# Patient Record
Sex: MALE | Race: WHITE | URBAN - METROPOLITAN AREA
[De-identification: names, ages, dates, MRNs, and addresses within clinical notes are randomized per-mention and may not be internally consistent; named-entity substitution may affect disease eponyms.]

---

## 2018-12-07 PROBLEM — M54.12 CERVICAL RADICULITIS: Status: ACTIVE | Noted: 2018-12-07

## 2020-02-03 NOTE — PROGRESS NOTES
.  This was a second opinion and I spent 45 minutes with this patient greater than 50% of the time face-to-face discussing treatment optionsChief Complaint    No chief complaint on file. History of Present Illness:  Gabbie Hirsch is a 79 y.o. malehere for evaluation chief complaint of bilateral foot and ankle pain right much greater than left. He states he is diabetic and has been for 20 years. His hemoglobin A1c is in the low sixes and has been for long time. He is neuropathic and takes gabapentin. He has a history of lung cancer in 2007 and sees a rheumatologist but he is not sure if he has rheumatoid arthritis. Currently complains of 8 out of 10 pain. It is getting worse with time. He has multiple joints that bother him and any activity increases his pain. Elevation his heat and immobility make it better. He is a retired trooper. Medical History:  Patient's medications, allergies, past medical, surgical, social and family histories were reviewed and updated as appropriate. Review of Systems:  Pertinent items are noted in HPI  Review of systems reviewed from Patient History Form dated on 2/4/2020 and available in the patient's chart under the Media tab. Vital Signs: There were no vitals taken for this visit. General Exam:   Constitutional: Patient is adequately groomed with no evidence of malnutrition  DTRs: Deep tendon reflexes are intact  Mental Status: The patient is oriented to time, place and person. The patient's mood and affect are appropriate. Lymphatic: The lymphatic examination bilaterally reveals all areas to be without enlargement or induration. Foot Examination:    Inspection: Planovalgus right foot with chronic venous changes on the right side with what looks like chronic shin cellulitis.   Left side foot is at least underneath him BMI is nearly 51    Palpation: Tenderness throughout the right foot and ankle with any motion    Range of Motion: 20 degrees of right ankle dorsiflexion 10 degrees of plantarflexion left side shows 20 degrees of dorsiflexion 40 degrees of plantarflexion    Strength: Generally 4-/5 throughout with no focal weakness    Special Tests: Decreased sensation to [employer] 5.0 7 monofilament    Skin: There are no rashes, ulcerations or lesions. Gait: Antalgic    Reflex 2+ and symmetric    Additional Comments:       Additional Examinations:         Contralateral Exam: Both feet are involved right greater than left    Radiology:     X-rays obtained and reviewed in office:  Views 3  Location right ankle  Impression obtained previously along with CT scan shows that he has midfoot and hindfoot collapse on the right side he has subtalar and ankle degenerative changes on the left side but he still somewhat well aligned          Assessment : This patient is diabetic neuropathic with apparently a systemic arthropathy for which she sees a rheumatologist.  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 Office Procedures:  No orders of the defined types were placed in this encounter. Treatment Plan:  The etiology of acquired planovalgus foot diabetes and systemic arthritis and it's appropriate treatment were discussed in great detail. I wrote out his plan of care and copied it to the media section of his chart. His operative option on the right side would be a triple arthrodesis or possible [employer] arthrodesis. His risk of surgery would be incredibly high due to his comorbidities BMI poor skin condition.   I would recommend he get an appropriate diabetic hightop shoe and insert for the left side and either a Crow or an AFO with custom shoe on the right side he agrees with this plan follow-up with me as needed

## 2020-02-04 ENCOUNTER — OFFICE VISIT (OUTPATIENT)
Dept: ORTHOPEDIC SURGERY | Age: 70
End: 2020-02-04
Payer: MEDICARE

## 2020-02-04 VITALS — HEIGHT: 72 IN | WEIGHT: 315 LBS | BODY MASS INDEX: 42.66 KG/M2

## 2020-02-04 PROBLEM — E11.610 DIABETIC NEUROPATHIC ARTHRITIS (HCC): Status: ACTIVE | Noted: 2020-02-04

## 2020-02-04 PROCEDURE — 99204 OFFICE O/P NEW MOD 45 MIN: CPT | Performed by: ORTHOPAEDIC SURGERY

## 2020-02-04 RX ORDER — OXYCODONE AND ACETAMINOPHEN 10; 325 MG/1; MG/1
1 TABLET ORAL EVERY 6 HOURS PRN
COMMUNITY

## 2020-02-04 RX ORDER — AMLODIPINE BESYLATE 10 MG/1
10 TABLET ORAL DAILY
COMMUNITY

## 2020-02-04 RX ORDER — HYDRALAZINE HYDROCHLORIDE 50 MG/1
50 TABLET, FILM COATED ORAL 3 TIMES DAILY
COMMUNITY
End: 2020-08-24

## 2020-02-04 RX ORDER — OMEPRAZOLE AND SODIUM BICARBONATE 40; 1100 MG/1; MG/1
1 CAPSULE ORAL
COMMUNITY

## 2020-02-04 RX ORDER — FLUOXETINE 10 MG/1
10 CAPSULE ORAL DAILY
COMMUNITY

## 2020-02-04 RX ORDER — FUROSEMIDE 40 MG/1
40 TABLET ORAL 2 TIMES DAILY
COMMUNITY
End: 2020-08-24

## 2020-02-04 RX ORDER — SPIRONOLACTONE 25 MG/1
25 TABLET ORAL DAILY
COMMUNITY

## 2020-02-04 RX ORDER — FOLIC ACID 1 MG/1
1 TABLET ORAL DAILY
COMMUNITY

## 2020-02-04 RX ORDER — BACLOFEN 10 MG/1
10 TABLET ORAL 3 TIMES DAILY
COMMUNITY

## 2020-02-04 RX ORDER — ALLOPURINOL 300 MG/1
300 TABLET ORAL DAILY
COMMUNITY

## 2020-02-04 RX ORDER — CELECOXIB 200 MG/1
200 CAPSULE ORAL 2 TIMES DAILY
COMMUNITY
End: 2020-08-24

## 2020-02-04 RX ORDER — LISINOPRIL 40 MG/1
40 TABLET ORAL DAILY
COMMUNITY
End: 2020-08-24

## 2020-02-04 RX ORDER — LEFLUNOMIDE 20 MG/1
20 TABLET ORAL DAILY
COMMUNITY

## 2020-02-04 RX ORDER — GABAPENTIN 600 MG/1
600 TABLET ORAL 2 TIMES DAILY
COMMUNITY

## 2020-02-04 RX ORDER — PREDNISONE 1 MG/1
5 TABLET ORAL DAILY
COMMUNITY
End: 2020-08-24

## 2020-02-04 RX ORDER — TRAZODONE HYDROCHLORIDE 100 MG/1
100 TABLET ORAL NIGHTLY
COMMUNITY

## 2020-02-04 RX ORDER — DOCUSATE SODIUM 100 MG/1
100 CAPSULE, LIQUID FILLED ORAL 2 TIMES DAILY
COMMUNITY

## 2020-02-04 RX ORDER — ATORVASTATIN CALCIUM 10 MG/1
10 TABLET, FILM COATED ORAL DAILY
COMMUNITY

## 2020-02-04 RX ORDER — FINASTERIDE 5 MG/1
5 TABLET, FILM COATED ORAL DAILY
COMMUNITY

## 2020-02-04 RX ORDER — CLONIDINE HYDROCHLORIDE 0.2 MG/1
0.2 TABLET ORAL 2 TIMES DAILY
COMMUNITY

## 2020-08-24 PROBLEM — R60.0 LOCALIZED EDEMA: Status: ACTIVE | Noted: 2020-08-24

## 2020-08-24 PROBLEM — N17.9 AKI (ACUTE KIDNEY INJURY) (HCC): Status: ACTIVE | Noted: 2020-08-24

## 2020-09-10 ENCOUNTER — OFFICE VISIT (OUTPATIENT)
Dept: RHEUMATOLOGY | Age: 70
End: 2020-09-10
Payer: MEDICARE

## 2020-09-10 VITALS — BODY MASS INDEX: 42.66 KG/M2 | HEIGHT: 72 IN | WEIGHT: 315 LBS | TEMPERATURE: 97.2 F

## 2020-09-10 PROCEDURE — 99205 OFFICE O/P NEW HI 60 MIN: CPT | Performed by: INTERNAL MEDICINE

## 2020-09-10 NOTE — PATIENT INSTRUCTIONS
Patient Education        abatacept  Pronunciation:  a BAY ta sept  Brand:  Balaia  What is the most important information I should know about abatacept? Follow all directions on your medicine label and package. Tell each of your healthcare providers about all your medical conditions, allergies, and all medicines you use. What is abatacept? Abatacept a protein that prevents your body's immune system from attacking healthy tissues such as joints. The immune system helps your body fight infections. In people with autoimmune disorders, the immune system mistakes the body's own cells for invaders and attacks them. Abatacept is used to treat the symptoms of rheumatoid arthritis, and to prevent joint damage caused by these conditions. This medicine is for adults and children who are at least 3years old. Abatacept is also used to treat active psoriatic arthritis in adults. Abatacept is not a cure for any autoimmune disorder and will only treat the symptoms of your condition. Abatacept may also be used for purposes not listed in this medication guide. What should I discuss with my healthcare provider before using abatacept? You should not use abatacept if you are allergic to it. Before using abatacept, tell your doctor if you have ever had tuberculosis, if anyone in your household has tuberculosis, or if you have recently traveled to an area where tuberculosis is common. To make sure abatacept is safe for you, tell your doctor if you have ever had:  · a weak immune system;  · any type of infection including a skin infection or open sores;  · infections that go away and come back;  · COPD (chronic obstructive pulmonary disease);  · diabetes;  · hepatitis; or  · if you are scheduled to receive any vaccines. Using abatacept may increase your risk of developing certain types of cancer such as lymphoma (cancer of the lymph nodes). This risk may be greater in older adults.  Talk to your doctor about your specific risk.  It is not known whether abatacept will harm an unborn baby. Tell your doctor if you are pregnant or plan to become pregnant. If you are pregnant, your name may be listed on a pregnancy registry. This is to track the outcome of the pregnancy and to evaluate any effects of abatacept on the baby. It is not known whether abatacept passes into breast milk or if it could affect the nursing baby. Tell your doctor if you are breast-feeding. Children using abatacept should be current on all childhood immunizations before starting treatment. How should I use abatacept? Before you start treatment with abatacept, your doctor may perform tests to make sure you do not have tuberculosis or other infections. Abatacept is injected under the skin, or into a vein through an IV. You may be shown how to use injections at home. Do not give yourself this medicine if you do not understand how to use the injection and properly dispose of needles, IV tubing, and other items used. Abatacept is injected under the skin when given to a child between 3and 10years old. Abatacept must be given slowly when injected into a vein, and the IV infusion can take at least 30 minutes to complete. This medicine is usually given every 1 to 4 weeks. Follow your doctor's instructions. You may need to mix abatacept with a liquid (diluent) before using it. If you are using the injections at home, be sure you understand how to properly mix and store the medication. Do not shake the medication bottle or you may ruin the medicine. Prepare your dose only when you are ready to give an injection. Do not use if the medicine has changed colors or has particles in it. Call your pharmacist for new medicine. Each single-use vial (bottle) or prefilled syringe of this medicine is for one use only. Throw away after one use, even if there is still some medicine left in it after injecting your dose. Use a disposable needle and syringe only once.  Follow any state or local laws about throwing away used needles and syringes. Use a puncture-proof \"sharps\" disposal container (ask your pharmacist where to get one and how to throw it away). Keep this container out of the reach of children and pets. If you need surgery, tell the surgeon ahead of time that you are using abatacept. If you have ever had hepatitis B, abatacept can cause this condition to come back or get worse. You will need frequent blood tests to check your liver function during treatment and for several months after you stop using this medicine. This medicine can cause false results with certain blood glucose tests, showing high blood sugar readings. If you have diabetes, talk to your doctor about the best way to check your blood sugar while you are using abatacept. Autoimmune disorders are often treated with a combination of different drugs. Use all medications as directed by your doctor. Read the medication guide or patient instructions provided with each medication. Do not change your doses or medication schedule without your doctor's advice. Store abatacept in the refrigerator. Do not freeze. Keep the medicine in original carton to protect it from light. Do not use abatacept if the expiration date on the medicine label has passed. If you need to transport the medicine, place the syringes in a cooler with ice packs. Abatacept that has been mixed with a diluent may be stored in a refrigerator or at room temperature and used within 24 hours. What happens if I miss a dose? Call your doctor for instructions if you miss your abatacept dose. What happens if I overdose? Seek emergency medical attention or call the Poison Help line at 1-805.885.2641. What should I avoid while using abatacept? Do not receive a \"live\" vaccine while using abatacept, and for at least 3 months after your treatment ends. The vaccine may not work as well during this time, and may not fully protect you from disease.  Live vaccines include measles, mumps, rubella (MMR), polio, rotavirus, typhoid, yellow fever, varicella (chickenpox), zoster (shingles), and nasal flu (influenza) vaccine. Avoid being near people who are sick or have infections. Tell your doctor at once if you develop signs of infection. What are the possible side effects of abatacept? Some side effects may occur during the injection. Tell your caregiver right away if you feel dizzy, light-headed, itchy, or have a severe headache or trouble breathing within 1 hour after receiving the injection. Get emergency medical help if you have signs of an allergic reaction:  hives; difficulty breathing; swelling of your face, lips, tongue, or throat. Serious and sometimes fatal infections may occur during treatment with abatacept. Stop using this medicine and call your doctor right away if you have signs of infection such as:  · fever, chills, night sweats, flu symptoms, weight loss;  · feeling very tired;  · dry cough, sore throat; or  · warmth, pain, or redness of your skin. Call your doctor at once if you have any of these other serious side effects:  · trouble breathing;  · stabbing chest pain, wheezing, cough with yellow or green mucus;  · pain or burning when you urinate; or  · signs of skin infection such as itching, swelling, warmth, redness, or oozing. Common side effects may include:  · fever;  · nausea, diarrhea, stomach pain;  · headache; or  · cold symptoms such as stuffy nose, sneezing, sore throat, cough. This is not a complete list of side effects and others may occur. Call your doctor for medical advice about side effects. You may report side effects to FDA at 5-909-FDA-5748. What other drugs will affect abatacept?   Tell your doctor about all your current medicines and any you start or stop using, especially:  · anakinra (Kineret);  · adalimumab (Humira);  · certolizumab (Cimzia);  · etanercept (Enbrel);  · golimumab (Simponi);  · infliximab (Remicade);  · rituximab (Rituxan); or  · tocilizumab (Actemra). This list is not complete. Other drugs may interact with abatacept, including prescription and over-the-counter medicines, vitamins, and herbal products. Not all possible interactions are listed in this medication guide. Where can I get more information? Your doctor or pharmacist can provide more information about abatacept. Remember, keep this and all other medicines out of the reach of children, never share your medicines with others, and use this medication only for the indication prescribed. Every effort has been made to ensure that the information provided by [facility]  is accurate, up-to-date, and complete, but no guarantee is made to that effect. Drug information contained herein may be time sensitive. [facility] information has been compiled for use by healthcare practitioners and consumers in the [place] and therefore [employer] does not warrant that uses outside of the [place] are appropriate, unless specifically indicated otherwise. [facility]'s drug information does not endorse drugs, diagnose patients or recommend therapy. [facility] drug information is an informational resource designed to assist licensed healthcare practitioners in caring for their patients and/or to serve consumers viewing this service as a supplement to, and not a substitute for, the expertise, skill, knowledge and judgment of healthcare practitioners. The absence of a warning for a given drug or drug combination in no way should be construed to indicate that the drug or drug combination is safe, effective or appropriate for any given patient. [facility] does not assume any responsibility for any aspect of healthcare administered with the aid of information [facility] provides. The information contained herein is not intended to cover all possible uses, directions, precautions, warnings, drug interactions, allergic reactions, or adverse effects.  If you have questions about the drugs you are taking, check with your doctor, nurse or pharmacist.  Copyright 6589-8562 [facility] [address] GARCIA. Version: 7.03. Revision date: 9/13/2017. Care instructions adapted under license by [facility] ([facility]). If you have questions about a medical condition or this instruction, always ask your healthcare professional. [address] any warranty or liability for your use of this information.

## 2020-09-22 ENCOUNTER — OFFICE VISIT (OUTPATIENT)
Dept: ORTHOPEDIC SURGERY | Age: 70
End: 2020-09-22
Payer: MEDICARE

## 2020-09-22 VITALS — BODY MASS INDEX: 42.66 KG/M2 | WEIGHT: 315 LBS | HEIGHT: 72 IN

## 2020-09-22 PROCEDURE — 99213 OFFICE O/P EST LOW 20 MIN: CPT | Performed by: ORTHOPAEDIC SURGERY

## 2020-09-22 NOTE — PROGRESS NOTES
dorsiflexion 40 degrees of plantarflexion 15 degrees of left ankle dorsiflexion 40 degrees of plantarflexion. Bilateral upper extremity strength is 3-/5 into shoulder flexion and abduction 3+ to 4-/5 strength into elbow flexion extension and 3/5 strength into wrist flexion extension. Shoulder flexion and abduction is approximately 90 degrees with limited internal and external rotation bilaterally. Elbow flexion extension wrist flexion extension are within normal limits he ambulates with an antalgic ataxic gait using a stand up rolling walker with forearm platforms. His O2 saturation is 97% on room air. Imaging: 3  views right ankle and 2 views of the right foot show a planovalgus foot with uncovering of the talar head  3 views of the left ankle and 2 views of the left foot show minimal degenerative changes through the midfoot  Assessment and plan: This patient has multiple comorbidities which make him a poor candidate for anything surgical.  We discussed operative treatment which would either be amputation versus TTC fusion which would be incredibly risky in light of all of his other medical problems. Nonoperative treatment would be adjustment of his braces and for him to obtain a electric scooter at which point he may benefit from a little bit of physical therapy to work on transfer training. I told him to call me immediately if he had any problems with skin or other musculoskeletal issues. He is already in pain management. His daughter seems very reasonable as does the patient medical conditions which impact the patient's mobility needs: Diabetes with diabetic neuropathy, chronic kidney disease lower extremity edema and cervical radiculitis. He cannot use a cane or walker due to his history of falls and lower extremity weakness. He also has poor balance. He cannot use a a scooter will not medically need this patient's mobility needs in the home.   This is due to lack of postural stability and has difficulty operating the tiller. He can safely operate the power mobility device both mentally and physically.   He is also willing and motivated to use the power mobility device in the home

## 2020-12-02 ENCOUNTER — OFFICE VISIT (OUTPATIENT)
Dept: ORTHOPEDIC SURGERY | Age: 70
End: 2020-12-02
Payer: MEDICARE

## 2020-12-02 VITALS — WEIGHT: 315 LBS | HEIGHT: 72 IN | BODY MASS INDEX: 42.66 KG/M2

## 2020-12-02 PROCEDURE — 99213 OFFICE O/P EST LOW 20 MIN: CPT | Performed by: ORTHOPAEDIC SURGERY

## 2020-12-02 PROCEDURE — E0114 CRUTCH UNDERARM PAIR NO WOOD: HCPCS | Performed by: ORTHOPAEDIC SURGERY

## 2020-12-02 NOTE — PROGRESS NOTES
Subjective: Patient is here for follow-up of diabetic neuropathic Charcot feet. When I initially saw him I put him in a [employer] and it helped him quite a bit. His leg got worse and then we got him into an electric scooter which she states helped a lot. 2 days ago he was trying to get the scooter into his car and had put it on the lift and felt a pain on the inside aspect of his right ankle. He states now it hurts so bad he can hardly even touch the area. He is in pain management on 600 mg of gabapentin 3 times a day he also gets narcotics from them. He is here with his wife  Objective: Physical exam shows Charcot deformity of the right ankle. Anywhere I touch him on the lower inside part of his right leg hurts him. He has pachyderma skin with some erythema but no skin disruption. States he cannot put any weight on this at all. He is on nasal oxygen and states his knees are bad and his shoulders are bad. Imaging: 3 views of the right ankle and 2 views of the right foot compared to his previous x-rays show minimal cystic changes in the talar head and tibiotalar joints although there is no significant change in position. There is no evidence of obvious new fracture  Assessment and plan: We spent 30 minutes discussing his treatment options. He is not a great candidate for surgery which would be a TTC fusion and could put him at severe risk of either amputation or death. His nonoperative options include the use of either a Crow , a cast which with the amount of swelling that he has would be dangerous, a splint which she could not tolerate. We gave him crutches and I will try to get Juan Malone to see him and get him set up with a new [employer] since his double upright AFO brace that he received on 3/18/2020 is not working. He needs a [employer] to provide total contact to prevent his ankle and midfoot from shifting.     He is going to increase his Neurontin to 600 mg 4 times a day

## 2020-12-24 ENCOUNTER — PATIENT MESSAGE (OUTPATIENT)
Dept: ORTHOPEDIC SURGERY | Age: 70
End: 2020-12-24

## 2020-12-28 NOTE — TELEPHONE ENCOUNTER
My opinion is the same as we have discussed previously. You are at high risk for complications. Usual length of the amputation is if you delayed a dollar bill on your joint line it is usually about 13 to 15 cm in length. You could still get infection and recurrent cellulitis in this area too.

## 2020-12-28 NOTE — TELEPHONE ENCOUNTER
From: Candelaria Poole  To: Maty Loaiza MD  Sent: 12/24/2020 7:00 PM EST  Subject: Non-Urgent Medical Question    They continued to speak of amputation as the next option. I asked for all of the records from this visit to be sent to you for review. 1 Did you receive them? 2 Have you reviewed them? 3 What is your opinion. 4 I believe that it is already beginning to flare up again. 5 What are your thoughts on amputation short of the emergency scenario we have previously spoke about and what location would one be preformed at?

## 2021-01-01 NOTE — PROGRESS NOTES
[facility], MD                                                           [address] [place] [address] Bhumika Doyle 1019 (v) [phone] (F)      Dear Dr. Katherene Boas, MD:  Please find Rheumatology assessment. Thank you for giving me the opportunity to be involved in Ema Lee's care and I look forward following Ema Perez along with you. If you have any questions or concerns please feel free to reach me. Note is transcribed using voice recognition software. Inadvertent computerized transcription errors may be present. Patient identification: Ema Lee,: 1950,70 y.o. Sex: male     A/P  Ema Perez was seen today for establish care. Diagnoses and all orders for this visit:    Inflammatory arthritis  -     Sedimentation Rate  -     C-Reactive Protein  -     Cyclic Citrul Peptide Antibody, IgG; Future  -     Rheumatoid Factor; Future  -     Quantiferon, Incubated    Generalized osteoarthritis    Polyarticular gout        Musculoskeletal discomfort is combination of inflammatory polyarthritis and generalized osteoarthritis. Most symptomatic joints are in his lower extremities right knee, both ankles right worse than left, and is severely deformed from osteoarthritis. He also has inflammatory arthritis affecting several finger joints and wrist.  Serologies are negative for RA including RF, CCP, HERB. Uric acid is 6.1 mg percent last value, not crystal proven diagnosis however has history of podagra in the past.  Is on allopurinol 300 mg a day, colchicine, methotrexate 20 mg weekly along with folic acid and hydroxychloroquine.   Prednisone provides good relief in his symptoms however raise his blood physician. #######################################################################    REASON FOR CONSULTATION:  Patient is being seen at the request of  Marilou Okeefe MD second opinion for management of arthritis. HISTORY OF PRESENT ILLNESS:  79 y.o. male multiple medical problems including diabetes, hypertension, DVT PE pulmonary hypertension on long-term oxygen, diastolic heart failure, generalized osteoarthritis-neck fusion, right knee replacement, tells me that he came down with bad arthritis approximately 2 years ago, has been getting progressively worse and current medications are not helping at all. He describes pain all over his body, worst pain in his lower extremity joints mainly right ankle which is severe osteoarthritis, left ankle, left knee. Pain and stiffness is also persistent in his shoulders as well as several fingers and wrist are swollen. He has been noticing progressive weakness in the  and just not able to make full fist when he wakes up in the morning. Wrist are very painful to move. Prednisone is the only medication that helps with inflammation however raised his blood sugar and gains weight on it. I reviewed his labs as well as x-rays from care everywhere [facility] and rheumatology notes. He tried several oral DMARDs including hydroxychloroquine, methotrexate, leflunomide, Humira, patient tells me that he did not notice any improvement in his symptoms although he is referring more to the lower extremity joints. History of podagra and gouty flares in his lower extremity joints, is on allopurinol. He was evaluated by foot and ankle orthopedics last year, nonsurgical evaluation was planned however his symptoms have gotten much worse since then. Denies any rashes, weight loss, fever, chills, GI/ symptoms, pleurisy, psoriasis, inflammatory bowel diseases. All other 12 system review of systems are negative.     Past Medical History:   Diagnosis Date    Chronic kidney disease     Diabetes mellitus ([facility] [address]     Heart abnormality     Hypertension      Past Surgical History:   Procedure Laterality Date    BACK SURGERY      KNEE SURGERY       Social History     Socioeconomic History    Marital status:      Spouse name: Not on file    Number of children: Not on file    Years of education: Not on file    Highest education level: Not on file   Occupational History    Not on file   Social Needs    Financial resource strain: Not on file    Food insecurity     Worry: Not on file     Inability: Not on file    Transportation needs     Medical: Not on file     Non-medical: Not on file   Tobacco Use    Smoking status: Former Smoker     Packs/day: 2.00     Years: 35.00     Pack years: 70.00     Types: Cigarettes     Last attempt to quit: 1/1/2010     Years since quitting: 10.6    Smokeless tobacco: Never Used   Substance and Sexual Activity    Alcohol use: Not Currently    Drug use: Never    Sexual activity: Yes     Partners: Female   Lifestyle    Physical activity     Days per week: Not on file     Minutes per session: Not on file    Stress: Not on file   Relationships    Social connections     Talks on phone: Not on file     Gets together: Not on file     Attends Holiness service: Not on file     Active member of club or organization: Not on file     Attends meetings of clubs or organizations: Not on file     Relationship status: Not on file    Intimate partner violence     Fear of current or ex partner: Not on file     Emotionally abused: Not on file     Physically abused: Not on file     Forced sexual activity: Not on file   Other Topics Concern    Not on file   Social History Narrative    Not on file       No family history of autoimmune diseases    Current Outpatient Medications   Medication Sig Dispense Refill    hydroxychloroquine (PLAQUENIL) 200 MG tablet Take 200 mg by mouth 2 times daily      methotrexate (RHEUMATREX) 2.5 MG chemo tablet TAKE 8 TABLETS (20 MG TOTAL) EVERY THURSDAY FOR INFLAMMATORY ARTHRITIS      tiZANidine (ZANAFLEX) 4 MG tablet tizanidine 4 mg tablet      spironolactone (ALDACTONE) 25 MG tablet TAKE 1 TABLET DAILY      XARELTO 20 MG TABS tablet TAKE ONE TABLET BY MOUTH EVERY DAY      allopurinol (ZYLOPRIM) 300 MG tablet Take 300 mg by mouth daily      aspirin 81 MG tablet Take 81 mg by mouth daily      atorvastatin (LIPITOR) 10 MG tablet Take 10 mg by mouth daily      amLODIPine (NORVASC) 10 MG tablet Take 10 mg by mouth daily      baclofen (LIORESAL) 10 MG tablet Take 10 mg by mouth 3 times daily      cloNIDine (CATAPRES) 0.2 MG tablet Take 0.2 mg by mouth 2 times daily      docusate sodium (COLACE) 100 MG capsule Take 100 mg by mouth 2 times daily      finasteride (PROSCAR) 5 MG tablet Take 5 mg by mouth daily      FLUoxetine (PROZAC) 10 MG capsule Take 10 mg by mouth daily      folic acid (FOLVITE) 1 MG tablet Take 1 mg by mouth daily      gabapentin (NEURONTIN) 600 MG tablet Take 600 mg by mouth 2 times daily.  leflunomide (ARAVA) 20 MG tablet Take 20 mg by mouth daily      Multiple Vitamins-Minerals (MENS MULTIVITAMIN PO) Take by mouth      metoprolol tartrate (LOPRESSOR) 12.5 mg TABS Take 12.5 mg by mouth 2 times daily      omeprazole-sodium bicarbonate (ZEGERID)  MG per capsule Take 1 capsule by mouth every morning (before breakfast)      oxyCODONE-acetaminophen (PERCOCET)  MG per tablet Take 1 tablet by mouth every 6 hours as needed for Pain.  spironolactone (ALDACTONE) 25 MG tablet Take 25 mg by mouth daily      traZODone (DESYREL) 100 MG tablet Take 100 mg by mouth nightly       No current facility-administered medications for this visit.       Allergies   Allergen Reactions    Duloxetine      Suicidal thoughts , hospitalized pt   Suicidal thoughts , hospitalized pt       Codeine Other (See Comments)     AGITATION  AGITATION  AGITATION  AGITATION  AGITATION  AGITATION      Cymbalta [Duloxetine Hcl]     Enbrel [Etanercept]      inj site reaction       PHYSICAL EXAM:    Vitals:    Temp 97.2 °F (36.2 °C) (Skin)   Ht 6' (1.829 m)   Wt (!) 363 lb (164.7 kg)   BMI 49.23 kg/m²   General appearance/ Psychiatric: well nourished, and well groomed, normal judgement, alert, appears stated age and cooperative. Morbidly obese with BMI of 49. MKS:  Both wrists are swollen, warm to touch, tender to pressure, range of motion are very limited because of the pain. Similarly all his MCPs second through fifth are tender, has synovitis, right hand worse than left, tenderness present across PIPs second third fourth bilaterally. Loose fist bilaterally. Shoulders-range of motion are limited by 50% in both shoulders in all directions. Knees have full range of motion right knee replaced, left knee is not warm, nontender, full range of motion. Severely deformed right ankle, looks subluxated. No obvious synovitis noted but has pitting edema bilaterally. Similarly OA changes are seen in left ankle and both feet joints. He is using walker for ambulation and using a special boot in his right foot and ankle for ankle osteoarthritis. No focal spine tenderness. Skin: No rashes, no induration or skin thickening or nodules. No evidence ischemia or deformities noted in digits or nails. HEENT: Normal lids, lacrimal glands and pupils. No oral or nasal ulcers. Salivary glands reveal no evidence of abnormality. External inspection of the ears and nose within normal limits. Neck: No masses or asymmetry. No thyroid enlargement. Chest: Normal effort, clear to auscultation. Heart:  Normal s1/s2, no leg edema  Neurologic: normal deep tendon reflexes. No foot or wrist drop.           DATA:   Lab Results   Component Value Date    WBC 8.9 08/20/2020    HGB 11.6 (L) 08/20/2020    HCT 35.8 (L) 08/20/2020    MCV 82.1 08/20/2020     08/20/2020         Chemistry        Component Value Date/Time     08/20/2020 1326 K 4.5 08/20/2020 1326     08/20/2020 1326    CO2 28 08/20/2020 1326    BUN 20 08/20/2020 1326    CREATININE 1.18 08/20/2020 1326        Component Value Date/Time    CALCIUM 9.5 08/20/2020 1326             Radiology Review:   X-ray reports reviewed from care everywhere [facility]-has severe osteoarthritis in his carpal joints, wrist, interphalangeal joints, ankle and feet joints bilaterally. A/P- See above. No

## 2024-05-16 ENCOUNTER — TELEPHONE (OUTPATIENT)
Dept: PULMONOLOGY | Age: 74
End: 2024-05-16

## 2024-05-16 NOTE — TELEPHONE ENCOUNTER
[facility] a [facility]. Called and lm for pt to set up appt. Ref old ov notes and ss results scanned in chart.

## 2024-06-06 ENCOUNTER — OFFICE VISIT (OUTPATIENT)
Dept: PULMONOLOGY | Age: 74
End: 2024-06-06
Payer: MEDICARE

## 2024-06-06 VITALS
BODY MASS INDEX: 42.66 KG/M2 | SYSTOLIC BLOOD PRESSURE: 128 MMHG | WEIGHT: 315 LBS | HEIGHT: 72 IN | OXYGEN SATURATION: 90 % | HEART RATE: 82 BPM | DIASTOLIC BLOOD PRESSURE: 76 MMHG | TEMPERATURE: 98.1 F

## 2024-06-06 DIAGNOSIS — I50.9 CHRONIC HEART FAILURE, UNSPECIFIED HEART FAILURE TYPE (HCC): ICD-10-CM

## 2024-06-06 DIAGNOSIS — I10 HYPERTENSION, UNSPECIFIED TYPE: ICD-10-CM

## 2024-06-06 DIAGNOSIS — G47.33 OSA ON CPAP: Primary | ICD-10-CM

## 2024-06-06 DIAGNOSIS — G47.00 INSOMNIA, UNSPECIFIED TYPE: ICD-10-CM

## 2024-06-06 DIAGNOSIS — E66.01 MORBID OBESITY WITH BMI OF 45.0-49.9, ADULT (HCC): ICD-10-CM

## 2024-06-06 DIAGNOSIS — I48.91 ATRIAL FIBRILLATION, UNSPECIFIED TYPE (HCC): ICD-10-CM

## 2024-06-06 PROCEDURE — 3078F DIAST BP <80 MM HG: CPT | Performed by: STUDENT IN AN ORGANIZED HEALTH CARE EDUCATION/TRAINING PROGRAM

## 2024-06-06 PROCEDURE — G8427 DOCREV CUR MEDS BY ELIG CLIN: HCPCS | Performed by: STUDENT IN AN ORGANIZED HEALTH CARE EDUCATION/TRAINING PROGRAM

## 2024-06-06 PROCEDURE — 1036F TOBACCO NON-USER: CPT | Performed by: STUDENT IN AN ORGANIZED HEALTH CARE EDUCATION/TRAINING PROGRAM

## 2024-06-06 PROCEDURE — G8417 CALC BMI ABV UP PARAM F/U: HCPCS | Performed by: STUDENT IN AN ORGANIZED HEALTH CARE EDUCATION/TRAINING PROGRAM

## 2024-06-06 PROCEDURE — 99214 OFFICE O/P EST MOD 30 MIN: CPT | Performed by: STUDENT IN AN ORGANIZED HEALTH CARE EDUCATION/TRAINING PROGRAM

## 2024-06-06 PROCEDURE — 3074F SYST BP LT 130 MM HG: CPT | Performed by: STUDENT IN AN ORGANIZED HEALTH CARE EDUCATION/TRAINING PROGRAM

## 2024-06-06 PROCEDURE — 1123F ACP DISCUSS/DSCN MKR DOCD: CPT | Performed by: STUDENT IN AN ORGANIZED HEALTH CARE EDUCATION/TRAINING PROGRAM

## 2024-06-06 PROCEDURE — G2211 COMPLEX E/M VISIT ADD ON: HCPCS | Performed by: STUDENT IN AN ORGANIZED HEALTH CARE EDUCATION/TRAINING PROGRAM

## 2024-06-06 PROCEDURE — 3017F COLORECTAL CA SCREEN DOC REV: CPT | Performed by: STUDENT IN AN ORGANIZED HEALTH CARE EDUCATION/TRAINING PROGRAM

## 2024-06-06 RX ORDER — AMITRIPTYLINE HYDROCHLORIDE 25 MG/1
25 TABLET, FILM COATED ORAL NIGHTLY PRN
COMMUNITY
Start: 2024-05-07

## 2024-06-06 RX ORDER — MORPHINE SULFATE 60 MG/1
60 CAPSULE, EXTENDED RELEASE ORAL EVERY 12 HOURS
COMMUNITY
Start: 2020-11-05

## 2024-06-06 RX ORDER — COLCHICINE 0.6 MG/1
0.6 TABLET ORAL DAILY
COMMUNITY
Start: 2024-04-29

## 2024-06-06 RX ORDER — PREDNISONE 5 MG/1
5 TABLET ORAL DAILY
COMMUNITY
Start: 2021-11-16

## 2024-06-06 RX ORDER — FERROUS SULFATE 325(65) MG
325 TABLET ORAL DAILY
COMMUNITY
Start: 2022-01-11

## 2024-06-06 RX ORDER — HYDRALAZINE HYDROCHLORIDE 50 MG/1
50 TABLET, FILM COATED ORAL 3 TIMES DAILY
COMMUNITY

## 2024-06-06 ASSESSMENT — SLEEP AND FATIGUE QUESTIONNAIRES
HOW LIKELY ARE YOU TO NOD OFF OR FALL ASLEEP WHILE WATCHING TV: SLIGHT CHANCE OF DOZING
HOW LIKELY ARE YOU TO NOD OFF OR FALL ASLEEP WHEN YOU ARE A PASSENGER IN A CAR FOR AN HOUR WITHOUT A BREAK: WOULD NEVER DOZE
HOW LIKELY ARE YOU TO NOD OFF OR FALL ASLEEP WHILE SITTING INACTIVE IN A PUBLIC PLACE: WOULD NEVER DOZE
HOW LIKELY ARE YOU TO NOD OFF OR FALL ASLEEP WHILE SITTING AND READING: SLIGHT CHANCE OF DOZING
ESS TOTAL SCORE: 5
HOW LIKELY ARE YOU TO NOD OFF OR FALL ASLEEP WHILE LYING DOWN TO REST IN THE AFTERNOON WHEN CIRCUMSTANCES PERMIT: HIGH CHANCE OF DOZING
HOW LIKELY ARE YOU TO NOD OFF OR FALL ASLEEP WHILE SITTING QUIETLY AFTER LUNCH WITHOUT ALCOHOL: WOULD NEVER DOZE
HOW LIKELY ARE YOU TO NOD OFF OR FALL ASLEEP IN A CAR, WHILE STOPPED FOR A FEW MINUTES IN TRAFFIC: WOULD NEVER DOZE
HOW LIKELY ARE YOU TO NOD OFF OR FALL ASLEEP WHILE SITTING AND TALKING TO SOMEONE: WOULD NEVER DOZE
NECK CIRCUMFERENCE (INCHES): 21.5

## 2024-06-06 NOTE — PATIENT INSTRUCTIONS
AIDE education:    You have obstructive sleep apnea (AIDE):    1. Obstructive sleep apnea (AIDE) is a condition where the upper airway narrows or closes intermittently during sleep. This can lead to drops in your oxygen levels during sleep, arousals from sleep, and excessive daytime sleepiness.     2. Untreated AIDE is associated with increased risk of hypertension, cardiac disease, myocardial infarction, stroke, and poor blood sugar control. Treating your AIDE can decrease these risks.    3. Weight loss does improve sleep apnea. It is important to have a healthy diet and an exercise program.     4. Alcohol and sedating medicine can make AIDE worse and should be avoided in particular before sleep.    5. If you have surgery or are hospitalized, tell your doctor that you have AIDE and bring your CPAP to the hospital.    6. If you are drowsy or sleepy, you should not drive. If you are driving and become drowsy or sleepy, you should pull over to a safe place. Below are our drowsy driving tips.     PAP machine care:   You should clean your PAP regularly (see your PAP  site for more details)  Daily cleaning   1. Wipe mask with a damp towel with mild detergent, rinse with a damp towel and let air dry. You can also see CPAP cleaning wipes. Avoid harsh cleaning wipes or chemicals.    2. Humidifier- empty water daily. Fill with clean distilled water before use before sleep.    Weekly Cleaning   1. Clean mask, headgear, and tubing weekly  2. Fill your sink with warm water and a few drops of mild dish soap. Swirl equipment for at least 5 minutes. Rinse well and air dry. Hang hose over something so the water droplets drip out.   3. Clean filter- rinse with warm water, squeeze excess water and blot dry with towel. If there is a white filter (respironics machine)- do not wash that - it is disposable and should be changed once a month.   4. Humidifier- Disinfect in solution that is one part vinegar and 5 parts water for 30

## 2024-06-06 NOTE — PROGRESS NOTES
Fax Number Effective Dates    [address] [phone]  6/1/2024 - None Entered    [facility] [id number]         Subscriber Name Subscriber Birth Date Member ID       DEEPAK DOBBS 1950 [id number]                      Electronically signed by Braeden Pascual MD on 06/06/24 at 5:21 PM.

## 2024-06-06 NOTE — PROGRESS NOTES
[facility]  Sleep Medicine  [address] [place] [address], [address]  [place] [address]    Chief Complaint   Patient presents with    New Patient     Had a sleep study 6-12 months ago. Has a non working Cpap       Alex Lee is a 74 y.o. male who comes in for evaluation of previously diagnosed AIDE. He was diagnosed in 25 years. He has been on CPAP since then but his current device stopped working since water got into it. He has been using a very old one since then. He needs a new CPAP.     Pertinent PMHx includes: AIDE, hypertension, a-fib, CHF, rheumatoid arthritis, gout.    He was diagnosed with obstructive sleep apnea and is being treated with PAP therapy.   He has been on PAP therapy for about 25 years.  He states he wears the PAP device every night.   He goes to bed at 11pm - midnight. He falls asleep in 30 minutes.  He awakens a few times per night (usually because of arthritic pain) and takes naps only occasionally. The average total amount of sleep is about 10 hours per night. He does use sleep aid/s: trazodone 100 mg nightly PRN and amitriptyline. Symptoms of sleep apnea have improved since using PAP therapy.  He is not snoring with the machine on.  He denies any complaints of too high pressure, hunger for air, dry mouth / nose, mask fit / discomfort issues, low air humidity, high air humidity, temperature issues, and high/frequent leaks.  DME: [employer]  Mask:  nasal pillow  Machine: Dreamstation 2 Auto CPAP      DATA REVIEWED TODAY:    Diagnostic Review  Records of previous sleep test report unavailable for my review at this time.    [place] & Insomnia Severity Index scores      6/6/2024     1:46 PM   Sleep Medicine   Sitting and reading 1   Watching TV 1   Sitting, inactive in a public place (e.g. a theatre or a meeting) 0   As a passenger in a car for an hour without a break 0   Lying down to rest in the afternoon when circumstances permit 3   Sitting and talking to someone 0   Sitting quietly after